# Patient Record
Sex: MALE | Race: WHITE | NOT HISPANIC OR LATINO | Employment: OTHER | URBAN - METROPOLITAN AREA
[De-identification: names, ages, dates, MRNs, and addresses within clinical notes are randomized per-mention and may not be internally consistent; named-entity substitution may affect disease eponyms.]

---

## 2020-12-20 PROBLEM — J45.909 ASTHMA IN ADULT, UNSPECIFIED ASTHMA SEVERITY, UNCOMPLICATED: Status: ACTIVE | Noted: 2019-08-06

## 2022-10-20 PROBLEM — M17.12 PRIMARY OSTEOARTHRITIS OF LEFT KNEE: Status: ACTIVE | Noted: 2022-10-20

## 2022-10-20 PROBLEM — M17.11 PRIMARY OSTEOARTHRITIS OF RIGHT KNEE: Status: ACTIVE | Noted: 2022-10-20

## 2023-01-17 PROBLEM — E87.1 HYPONATREMIA: Status: ACTIVE | Noted: 2023-01-17

## 2023-01-31 PROBLEM — Z96.651 HISTORY OF RIGHT KNEE JOINT REPLACEMENT: Status: ACTIVE | Noted: 2023-01-31

## 2023-01-31 PROBLEM — M17.11 PRIMARY OSTEOARTHRITIS OF RIGHT KNEE: Status: RESOLVED | Noted: 2022-10-20 | Resolved: 2023-01-31

## 2023-05-01 PROBLEM — R53.81 DEBILITY: Status: ACTIVE | Noted: 2023-05-01

## 2023-05-01 PROBLEM — R03.0 ELEVATED BLOOD PRESSURE READING: Status: ACTIVE | Noted: 2023-05-01

## 2023-05-02 PROBLEM — Z96.652 HISTORY OF LEFT KNEE REPLACEMENT: Status: ACTIVE | Noted: 2023-01-31

## 2023-05-02 PROBLEM — M17.12 PRIMARY OSTEOARTHRITIS OF LEFT KNEE: Status: RESOLVED | Noted: 2022-10-20 | Resolved: 2023-05-02

## 2023-05-16 PROBLEM — Z96.652 HISTORY OF LEFT KNEE REPLACEMENT: Status: ACTIVE | Noted: 2023-05-16

## 2023-06-21 PROBLEM — T84.54XA INFECTION OF PROSTHETIC LEFT KNEE JOINT: Status: ACTIVE | Noted: 2023-06-21

## 2023-06-22 PROBLEM — Z98.890 STATUS POST DEBRIDEMENT: Status: ACTIVE | Noted: 2023-06-22

## 2023-06-22 PROBLEM — D64.9 ANEMIA: Status: ACTIVE | Noted: 2023-06-22

## 2023-06-24 PROBLEM — D62 ACUTE BLOOD LOSS ANEMIA: Status: ACTIVE | Noted: 2023-06-24

## 2023-07-20 PROBLEM — J18.9 PNEUMONIA OF BOTH LOWER LOBES DUE TO INFECTIOUS ORGANISM: Status: ACTIVE | Noted: 2023-07-20

## 2023-07-21 PROBLEM — J96.01 ACUTE RESPIRATORY FAILURE WITH HYPOXIA: Status: ACTIVE | Noted: 2023-07-21

## 2023-07-22 PROBLEM — I49.9 IRREGULAR HEART RHYTHM: Status: ACTIVE | Noted: 2023-07-22

## 2023-07-24 PROBLEM — I49.9 IRREGULAR HEART RHYTHM: Status: RESOLVED | Noted: 2023-07-22 | Resolved: 2023-07-24

## 2023-07-24 PROBLEM — I47.29 NSVT (NONSUSTAINED VENTRICULAR TACHYCARDIA): Status: ACTIVE | Noted: 2023-07-24

## 2023-07-25 PROBLEM — R79.89 ELEVATED D-DIMER: Status: ACTIVE | Noted: 2023-07-25

## 2023-07-25 PROBLEM — D72.829 LEUKOCYTOSIS: Status: ACTIVE | Noted: 2023-07-25

## 2023-10-23 PROBLEM — J96.01 ACUTE RESPIRATORY FAILURE WITH HYPOXIA: Status: RESOLVED | Noted: 2023-07-21 | Resolved: 2023-10-23

## 2023-10-30 PROBLEM — J18.9 PNEUMONIA OF BOTH LOWER LOBES DUE TO INFECTIOUS ORGANISM: Status: RESOLVED | Noted: 2023-07-20 | Resolved: 2023-10-30

## 2024-04-04 ENCOUNTER — PATIENT OUTREACH (OUTPATIENT)
Dept: ADMINISTRATIVE | Facility: HOSPITAL | Age: 74
End: 2024-04-04

## 2024-04-04 NOTE — PROGRESS NOTES
Non-compliant report chart audits for CMS/[facility], COLON CANCER SCREENING. Chart review completed for  test overdue (mammograms, Colonoscopies, pap smears, DM labs, and/or EYE EXAMs)      Care Everywhere and media, updates requested and reviewed.      No documentation of colon cancer screening